# Patient Record
Sex: MALE | Race: WHITE | NOT HISPANIC OR LATINO | URBAN - METROPOLITAN AREA
[De-identification: names, ages, dates, MRNs, and addresses within clinical notes are randomized per-mention and may not be internally consistent; named-entity substitution may affect disease eponyms.]

---

## 2022-11-08 ENCOUNTER — OFFICE VISIT (OUTPATIENT)
Dept: URGENT CARE | Facility: CLINIC | Age: 72
End: 2022-11-08
Payer: MEDICARE

## 2022-11-08 VITALS
HEART RATE: 64 BPM | BODY MASS INDEX: 31.81 KG/M2 | DIASTOLIC BLOOD PRESSURE: 79 MMHG | SYSTOLIC BLOOD PRESSURE: 131 MMHG | OXYGEN SATURATION: 97 % | WEIGHT: 240 LBS | TEMPERATURE: 98 F | RESPIRATION RATE: 20 BRPM | HEIGHT: 73 IN

## 2022-11-08 DIAGNOSIS — J06.9 VIRAL URI WITH COUGH: Primary | ICD-10-CM

## 2022-11-08 PROCEDURE — 96372 PR INJECTION,THERAP/PROPH/DIAG2ST, IM OR SUBCUT: ICD-10-PCS | Mod: S$GLB,,, | Performed by: EMERGENCY MEDICINE

## 2022-11-08 PROCEDURE — 96372 THER/PROPH/DIAG INJ SC/IM: CPT | Mod: S$GLB,,, | Performed by: EMERGENCY MEDICINE

## 2022-11-08 PROCEDURE — 99214 PR OFFICE/OUTPT VISIT, EST, LEVL IV, 30-39 MIN: ICD-10-PCS | Mod: 25,S$GLB,, | Performed by: PHYSICIAN ASSISTANT

## 2022-11-08 PROCEDURE — 99214 OFFICE O/P EST MOD 30 MIN: CPT | Mod: 25,S$GLB,, | Performed by: PHYSICIAN ASSISTANT

## 2022-11-08 RX ORDER — AMLODIPINE BESYLATE 5 MG/1
1 TABLET ORAL DAILY
COMMUNITY
Start: 2022-07-21

## 2022-11-08 RX ORDER — ATORVASTATIN CALCIUM 20 MG/1
1 TABLET, FILM COATED ORAL NIGHTLY
COMMUNITY
Start: 2022-07-22 | End: 2023-07-22

## 2022-11-08 RX ORDER — DEXAMETHASONE SODIUM PHOSPHATE 100 MG/10ML
10 INJECTION INTRAMUSCULAR; INTRAVENOUS
Status: COMPLETED | OUTPATIENT
Start: 2022-11-08 | End: 2022-11-08

## 2022-11-08 RX ORDER — PROMETHAZINE HYDROCHLORIDE AND DEXTROMETHORPHAN HYDROBROMIDE 6.25; 15 MG/5ML; MG/5ML
5 SYRUP ORAL EVERY 6 HOURS PRN
Qty: 118 ML | Refills: 0 | Status: SHIPPED | OUTPATIENT
Start: 2022-11-08

## 2022-11-08 RX ORDER — ERGOCALCIFEROL 1.25 MG/1
50000 CAPSULE ORAL
COMMUNITY
Start: 2021-12-23 | End: 2022-12-23

## 2022-11-08 RX ORDER — AMOXICILLIN 500 MG
2 CAPSULE ORAL
COMMUNITY

## 2022-11-08 RX ADMIN — DEXAMETHASONE SODIUM PHOSPHATE 10 MG: 100 INJECTION INTRAMUSCULAR; INTRAVENOUS at 10:11

## 2022-11-08 NOTE — PATIENT INSTRUCTIONS
Please follow up with your Primary care provider within 2-5 days if your signs and symptoms have not resolved or worsen.     If your condition worsens or fails to improve we recommend that you receive another evaluation at the emergency room immediately or contact your primary medical clinic to discuss your concerns.   You must understand that you have received an Urgent Care treatment only and that you may be released before all of your medical problems are known or treated. You, the patient, will arrange for follow up care as instructed.     RED FLAGS/WARNING SYMPTOMS DISCUSSED WITH PATIENT THAT WOULD WARRANT EMERGENT MEDICAL ATTENTION. PATIENT VERBALIZED UNDERSTANDING.     General Instructions for Upper Respiratory Infection (URI):     Alternate Tylenol and Ibuprofen every 3 hrs for fever, pain and inflammation.   Avoid NSAIDs (Ibuprofen, Aleve, Motrin, Aspirin) if you are pregnant, or have advanced kidney disease or history of stomach ulcers/bleeding.     Sore throat/Post Nasal Drip:  Salt water gargles, chloraseptic spray, lozenges, or cough drops   Honey/lemon water or warm tea   Cepachol   Zantac will help if there is reflux from the post nasal drip and helpful to take at night     Sinus Congestion/Runny nose:  Zyrtec/Claritin/Allegra during the day and Benadryl at night as needed  Mucinex, Dayquil, or Coricidin   If you DO NOT have Hypertension (high blood pressure) or any history of palpitations, it is ok to take over the counter Sudafed or Mucinex D or Allegra-D or Claritin-D or Zyrtec-D.  If you do take one of the above, it is ok to combine that with plain over the counter Mucinex or Allegra or Claritin or Zyrtec. If, for example, you are taking Zyrtec -D, you can combine that with Mucinex, but not Mucinex-D. If you are taking Mucinex-D, you can combine that with plain Allegra or Claritin or Zyrtec.  If you DO have Hypertension or palpitations, it is safe to take Coricidin HBP for relief of sinus  symptoms.  Nasal saline spray reduces inflammation and dryness  Flonase OTC or Nasacort OTC to help decrease inflammation in nasal turbinates and allow sinuses to drain  Warm face compresses/hot showers as often as you can to open sinuses and allow to drain.   Vicks vapor rub and/or humidifier at night  Cold-eeze helps to reduce the duration of URI symptoms if taken early  Elderberry, Emergen-C, and/or Zinc to reduce duration of viral URI symptoms    Cough:  Robitussin or Delsym as needed  Cough drops  Vicks vapor rub and/or humidifier at night       Rest as much as you can     Your symptoms are likely viral and will typically last 7-10 days, maybe longer depending on how it affects your body.  You are contagious until day 5-7, so minimize contact with others to reduce the spread to others and stay home from work or school as we discussed. Dehydration is preventable but is one of the main reasons why you will feel so badly. Drink pedialyte, gatorade or propel. Stay hydrated.  Antibiotics are not needed unless a complication( such as Otitis Media, Bacterial sinus infection or pneumonia) develops. Taking antibiotics for Flu/Cold is not supported by evidence-based medicine and can expose you to unnecessary side effects of the medication, such as anaphylaxis, yeast infection and leads to antibiotic resistance.     Please follow up with your primary care provider within 5-7 days if your signs and symptoms have not resolved or worsen.  If your condition worsens or fails to improve we recommend that you receive another evaluation at the emergency  room immediately or contact your primary medical clinic to discuss your concerns.  You must understand that you have received an Urgent Care treatment only and that you may be released before all of  your medical problems are known or treated. You, the patient, will arrange for follow up care as instructed.    Go to Emergency Room immediately if you experience any:  Chest pain,  shortness of breath, wheezing or difficulty breathing,  Severe headache, face, neck or ear pain,  New rash,  Fever over 101.5º F (38.6 C) for more than three days,  Confusion, behavior change or seizure,  Severe weakness or dizziness or passing out

## 2022-11-08 NOTE — PROGRESS NOTES
"Subjective:       Patient ID: Andrew Gonzales is a 72 y.o. male.    Vitals:  height is 6' 1" (1.854 m) and weight is 108.9 kg (240 lb). His tympanic temperature is 97.7 °F (36.5 °C). His blood pressure is 131/79 and his pulse is 64. His respiration is 20 and oxygen saturation is 97%.     Chief Complaint: Sinus Problem    Patient presents to clinic with productive cough and nasal congestion that started about 3 days ago.  Denies any wheezing, shortness of breath, chest pain.  Patient states that he does feel like he has had low-grade fever but has not taken his temperature.  He has been taking Mucinex and Zicam with some relief.  He is requesting steroid injection.  Does not wish to be COVID tested.    Sinus Problem  This is a new problem. Episode onset: 11/5. The problem has been gradually worsening since onset. There has been no fever. His pain is at a severity of 0/10. He is experiencing no pain. Associated symptoms include congestion, coughing and sinus pressure. Pertinent negatives include no chills, diaphoresis, ear pain, headaches, shortness of breath or sore throat. (Drainage, mucus) Past treatments include oral decongestants. The treatment provided no relief.     Constitution: Negative for chills, sweating and fever.   HENT:  Positive for congestion and sinus pressure. Negative for ear pain and sore throat.    Cardiovascular: Negative.    Respiratory:  Positive for cough and sputum production. Negative for chest tightness, shortness of breath and wheezing.    Gastrointestinal: Negative.    Musculoskeletal: Negative.    Skin: Negative.    Neurological:  Negative for headaches.     Objective:      Physical Exam   Constitutional: He appears well-developed.  Non-toxic appearance. He does not appear ill. No distress.   HENT:   Head: Normocephalic and atraumatic.   Ears:   Right Ear: External ear normal.   Left Ear: External ear normal.   Nose: Rhinorrhea and congestion present.   Mouth/Throat: Mucous membranes " are moist. Oropharynx is clear.   Eyes: Conjunctivae and EOM are normal.   Neck: Neck supple.   Pulmonary/Chest: Effort normal and breath sounds normal. No respiratory distress. He has no wheezes.         Comments: + coughing intermittently    Abdominal: Normal appearance.   Musculoskeletal: Normal range of motion.         General: Normal range of motion.   Neurological: no focal deficit. He is alert. He displays no weakness. Gait normal.   Skin: Skin is warm, dry, not diaphoretic, not pale and no rash.   Psychiatric: His behavior is normal.       Assessment:       1. Viral URI with cough          Plan:       Patient requested steroid injection. Discussed risks & possible side effects of steroid injection. Pt verbalized understanding of risks associated with injection and wished to proceed with treatment.  Promethazine DM as needed and discussed may cause drowsiness.  Continue to monitor symptoms and follow up with PCP if symptoms worsen or fail to improve.    Viral URI with cough  -     promethazine-dextromethorphan (PROMETHAZINE-DM) 6.25-15 mg/5 mL Syrp; Take 5 mLs by mouth every 6 (six) hours as needed (cough). May cause drowsiness.  Dispense: 118 mL; Refill: 0  -     dexAMETHasone injection 10 mg

## 2022-12-26 ENCOUNTER — OFFICE VISIT (OUTPATIENT)
Dept: URGENT CARE | Facility: CLINIC | Age: 72
End: 2022-12-26
Payer: MEDICARE

## 2022-12-26 VITALS
DIASTOLIC BLOOD PRESSURE: 90 MMHG | WEIGHT: 250 LBS | HEIGHT: 73 IN | HEART RATE: 81 BPM | RESPIRATION RATE: 18 BRPM | SYSTOLIC BLOOD PRESSURE: 149 MMHG | OXYGEN SATURATION: 96 % | TEMPERATURE: 101 F | BODY MASS INDEX: 33.13 KG/M2

## 2022-12-26 DIAGNOSIS — J02.8 SORE THROAT (VIRAL): ICD-10-CM

## 2022-12-26 DIAGNOSIS — U07.1 COVID-19 VIRUS DETECTED: ICD-10-CM

## 2022-12-26 DIAGNOSIS — U07.1 COVID: Primary | ICD-10-CM

## 2022-12-26 DIAGNOSIS — J10.1 INFLUENZA B: ICD-10-CM

## 2022-12-26 DIAGNOSIS — B97.89 SORE THROAT (VIRAL): ICD-10-CM

## 2022-12-26 LAB
CTP QC/QA: YES
CTP QC/QA: YES
POC MOLECULAR INFLUENZA A AGN: NEGATIVE
POC MOLECULAR INFLUENZA B AGN: POSITIVE
SARS-COV-2 AG RESP QL IA.RAPID: POSITIVE

## 2022-12-26 PROCEDURE — 87502 POCT INFLUENZA A/B MOLECULAR: ICD-10-PCS | Mod: QW,S$GLB,,

## 2022-12-26 PROCEDURE — 87502 INFLUENZA DNA AMP PROBE: CPT | Mod: QW,S$GLB,,

## 2022-12-26 PROCEDURE — 99214 OFFICE O/P EST MOD 30 MIN: CPT | Mod: CS,S$GLB,,

## 2022-12-26 PROCEDURE — 87811 SARS CORONAVIRUS 2 ANTIGEN POCT, MANUAL READ: ICD-10-PCS | Mod: QW,CR,S$GLB,

## 2022-12-26 PROCEDURE — 87811 SARS-COV-2 COVID19 W/OPTIC: CPT | Mod: QW,CR,S$GLB,

## 2022-12-26 PROCEDURE — 99214 PR OFFICE/OUTPT VISIT, EST, LEVL IV, 30-39 MIN: ICD-10-PCS | Mod: CS,S$GLB,,

## 2022-12-26 RX ORDER — OSELTAMIVIR PHOSPHATE 75 MG/1
75 CAPSULE ORAL 2 TIMES DAILY
Qty: 10 CAPSULE | Refills: 0 | Status: SHIPPED | OUTPATIENT
Start: 2022-12-26 | End: 2022-12-31

## 2022-12-26 NOTE — PROGRESS NOTES
"Subjective:       Patient ID: Andrew Gonzales is a 72 y.o. male.    Vitals:  height is 6' 1" (1.854 m) and weight is 113.4 kg (250 lb). His tympanic temperature is 100.7 °F (38.2 °C) (abnormal). His blood pressure is 149/90 (abnormal) and his pulse is 81. His respiration is 18 and oxygen saturation is 96%.     Chief Complaint: Sore Throat (Body aches, congestion, and productive cough)    Pt presents to the clinic today with sore throat, body aches, congestion, and productive cough that all began about 2 days ago. Pt states that he saw his Dr last week, had a chest xray done and was told he needs to be seen by a pulmonologist but could not get appt due to holidays.     Sore Throat   This is a new problem. The current episode started in the past 7 days. The problem has been gradually worsening. Neither side of throat is experiencing more pain than the other. There has been no fever. The pain is at a severity of 5/10. The pain is moderate. Associated symptoms include congestion and coughing. Pertinent negatives include no abdominal pain, diarrhea, ear discharge, ear pain, headaches, neck pain, shortness of breath, trouble swallowing or vomiting. He has had no exposure to strep or mono. Treatments tried: Zycam. The treatment provided no relief.     Constitution: Negative for chills, fatigue and fever.   HENT:  Positive for congestion and sore throat. Negative for ear pain, ear discharge, postnasal drip, sinus pain and trouble swallowing.    Neck: Negative for neck pain and neck stiffness.   Cardiovascular:  Negative for chest pain.   Eyes:  Negative for eye pain.   Respiratory:  Positive for cough. Negative for sputum production, shortness of breath and wheezing.    Gastrointestinal:  Negative for abdominal pain, nausea, vomiting and diarrhea.   Neurological:  Negative for dizziness and headaches.     Objective:      Physical Exam   Constitutional: He is oriented to person, place, and time. He appears well-developed. " He is cooperative.  Non-toxic appearance. He does not appear ill. No distress.      Comments:Sitting comfortably in exam room, well appearing. NAD  Able to talk in complete sentences without difficulty or pause       HENT:   Head: Normocephalic and atraumatic.   Ears:   Right Ear: Hearing, tympanic membrane, external ear and ear canal normal.   Left Ear: Hearing, tympanic membrane, external ear and ear canal normal.   Nose: Nose normal. No mucosal edema, rhinorrhea or nasal deformity. No epistaxis. Right sinus exhibits no maxillary sinus tenderness and no frontal sinus tenderness. Left sinus exhibits no maxillary sinus tenderness and no frontal sinus tenderness.   Mouth/Throat: Uvula is midline, oropharynx is clear and moist and mucous membranes are normal. No trismus in the jaw. Normal dentition. No uvula swelling. No oropharyngeal exudate, posterior oropharyngeal edema or posterior oropharyngeal erythema.   Eyes: Conjunctivae and lids are normal. No scleral icterus.   Neck: Trachea normal and phonation normal. Neck supple. No edema present. No erythema present. No neck rigidity present.   Cardiovascular: Normal rate, regular rhythm, normal heart sounds and normal pulses.   Pulmonary/Chest: Effort normal and breath sounds normal. No respiratory distress. He has no decreased breath sounds. He has no rhonchi.         Comments: No increased WOB  O2 sat 96% RA    Abdominal: Normal appearance.   Musculoskeletal: Normal range of motion.         General: No deformity. Normal range of motion.   Neurological: He is alert and oriented to person, place, and time. He exhibits normal muscle tone. Coordination normal.   Skin: Skin is warm, dry, intact, not diaphoretic and not pale.   Psychiatric: His speech is normal and behavior is normal. Judgment and thought content normal.   Nursing note and vitals reviewed.      Assessment:       1. COVID    2. Sore throat (viral)    3. Influenza B            Plan:         Pos covid and flu  B. Discussed tx with both anti-virals as patient has risk factors for progression to worsening illness. Patient would like to do both paxlovid and tamiflu. Discussed side effects in depth regarding each medication as well as how to properly take. Discussed expected course of illness. Red flag signs/sx that warrants ED evaluation discussed with patient/parent who verbalized understanding      COVID  -     nirmatrelvir-ritonavir 300 mg (150 mg x 2)-100 mg copackaged tablets (EUA); Take 3 tablets by mouth 2 (two) times daily for 5 days. Each dose contains 2 nirmatrelvir (pink tablets) and 1 ritonavir (white tablet). Take all 3 tablets together  Dispense: 30 tablet; Refill: 0    Sore throat (viral)  -     SARS Coronavirus 2 Antigen, POCT Manual Read  -     POCT Influenza A/B MOLECULAR    Influenza B  -     oseltamivir (TAMIFLU) 75 MG capsule; Take 1 capsule (75 mg total) by mouth 2 (two) times daily. for 5 days  Dispense: 10 capsule; Refill: 0

## 2022-12-26 NOTE — PATIENT INSTRUCTIONS
Stop your cholesterol medication (Atorvastatin) for the 5 days you are taking Paxlovid. Monitor your BP as well to ensure it is not dropping to low  OTC Immitrol is available for nausea as well as pepto bismol        You have tested positive for COVID-19 today.      ISOLATION  If you tested positive and do not have symptoms, you must isolate for 5 days starting on the day of the positive test. I    If you tested positive and have symptoms, you must isolate for 5 days starting on the day of the first symptoms,  not the day of the positive test.     This is the most important part, both the CDC and the [facility] emphasize that you do not test out of isolation.     After 5 days, if your symptoms have improved and you have not had fever on day 5, you can return to the community on day 6- NO TESTING REQUIRED!      In fact, we do not retest if you were positive in the last 90 days.    After your 5 days of isolation are completed, the CDC recommends strict mask use for the first 5 days that you come out of isolation. PLEASE FOLLOW CDC GUIDELINES REGARDING TRAVEL AFTER COVID INFECTION.    Return to Urgent Care or go to ER if symptoms worsen or fail to improve.  Follow up with PCP as recommended for further management.     Your symptoms should begin to improve by Day 5 of the infection-- if symptoms worsen, you develop a new fever, shortness of breath, worsening shortness of breath with activity, chest pain, worsening cough, you must return to Urgent Care or go to the ER.        Please follow up with your primary care doctor or specialist in the next 48-72hrs as needed and if no improvement    If you  smoke, please stop smoking.      Please return or see your primary care doctor if you develop new or worsening symptoms.     Please arrange follow up with your primary medical clinic as soon as possible. You must understand that you've received an Urgent Care treatment only and that you may be released before all of your medical  problems are known or treated. You, the patient, will arrange for follow up as instructed. If your symptoms worsen or fail to improve you should go to the Emergency Room.        You have been prescribed Paxlovid, which is an antiviral medication.     Oral antiviral treatment may help your body fight COVID-19 by stopping the SARS-CoV-2 virus (the virus that causes COVID-19) from multiplying in your body, lowering the amount of the virus within your body, or helping your immune system. By getting treatment, you could have less serious symptoms and may lower the chances of your illness getting worse and needing care in the hospital.    To be eligible for Paxlovid, you must be at least 12 years of age and weigh at least 88 pounds.    What is PAXLOVID?    PAXLOVID is an investigational medicine used to treat mild-to-moderate COVID-19 in  adults and children [12 years of age and older weighing at least 88 pounds (40 kg)] with  positive results of direct SARS-CoV-2 viral testing, and who are at high risk for  progression to severe COVID-19, including hospitalization or death. PAXLOVID is  investigational because it is still being studied. There is limited information about the  safety and effectiveness of using PAXLOVID to treat people with mild-to-moderate  COVID-19.  The FDA has authorized the emergency use of PAXLOVID for the treatment of mild-tomoderate COVID-19 in adults and children [12 years of age and older weighing at least  88 pounds (40 kg)] with a positive test for the virus that causes COVID-19, and who are  at high risk for progression to severe COVID-19, including hospitalization or death,  under an EUA.    Tell your healthcare provider if you:  ? Have any allergies  ? Have liver or kidney disease  ? Are pregnant or plan to become pregnant  ? Are breastfeeding a child  ? Have any serious illnesses  Tell your healthcare provider about all the medicines you take, including  prescription and over-the-counter  medicines, vitamins, and herbal supplements.  Some medicines may interact with PAXLOVID and may cause serious side effects.  Keep a list of your medicines to show your healthcare provider and pharmacist when  you get a new medicine.  You can ask your healthcare provider or pharmacist for a list of medicines that interact  with PAXLOVID. Do not start taking a new medicine without telling your  healthcare provider. Your healthcare provider can tell you if it is safe to take  PAXLOVID with other medicines    How do I take PAXLOVID?  ? PAXLOVID consists of 2 medicines: nirmatrelvir and ritonavir.  Take 2 pink tablets of nirmatrelvir with 1 white tablet of ritonavir by mouth  2 times each day (in the morning and in the evening) for 5 days. For each  dose, take all 3 tablets at the same time.  If you have kidney disease, talk to your healthcare provider. You  may need a different dose.  Swallow the tablets whole. Do not chew, break, or crush the tablets.  Take PAXLOVID with or without food.  Do not stop taking PAXLOVID without talking to your healthcare provider, even if  you feel better.  If you miss a dose of PAXLOVID within 8 hours of the time it is usually taken,  take it as soon as you remember. If you miss a dose by more than 8 hours, skip  the missed dose and take the next dose at your regular time. Do not take  2 doses of PAXLOVID at the same time.  If you take too much PAXLOVID, call your healthcare provider or go to the  nearest hospital emergency room right away.  If you are taking a ritonavir- or cobicistat-containing medicine to treat hepatitis C  or Human Immunodeficiency Virus (HIV), you should continue to take your  medicine as prescribed by your healthcare provider    Possible side effects of PAXLOVID are:  ? Allergic Reactions. Allergic reactions can happen in people taking  PAXLOVID, even after only 1 dose. Stop taking PAXLOVID and call your  healthcare provider right away if you get any of the  following symptoms of an  allergic reaction:  hives  trouble swallowing or breathing  swelling of the mouth, lips, or face  throat tightness  hoarseness  skin rash  ? Liver Problems. Tell your healthcare provider right away if you have any of  these signs and symptoms of liver problems: loss of appetite, yellowing of your  skin and the whites of eyes (jaundice), dark-colored urine, pale colored stools  and itchy skin, stomach area (abdominal) pain.  ? Resistance to HIV Medicines: If you have untreated HIV infection, PAXLOVID  may lead to some HIV medicines not working as well in the future.  ? Other possible side effects include:  altered sense of taste  diarrhea  high blood pressure  muscle aches  These are not all the possible side effects of PAXLOVID. Not many people have taken  PAXLOVID. Serious and unexpected side effects may happen. PAXLOVID is still being  studied, so it is possible that all of the risks are not known at this time.

## 2023-01-16 ENCOUNTER — HOSPITAL ENCOUNTER (OUTPATIENT)
Dept: RADIOLOGY | Facility: CLINIC | Age: 73
Discharge: HOME | End: 2023-01-16
Attending: PHYSICIAN ASSISTANT
Payer: MEDICARE

## 2023-01-16 ENCOUNTER — OFFICE VISIT (OUTPATIENT)
Dept: URGENT CARE | Facility: CLINIC | Age: 73
End: 2023-01-16
Payer: MEDICARE

## 2023-01-16 VITALS
HEART RATE: 101 BPM | TEMPERATURE: 98 F | DIASTOLIC BLOOD PRESSURE: 91 MMHG | OXYGEN SATURATION: 93 % | RESPIRATION RATE: 20 BRPM | HEIGHT: 73 IN | SYSTOLIC BLOOD PRESSURE: 153 MMHG | BODY MASS INDEX: 33.13 KG/M2 | WEIGHT: 250 LBS

## 2023-01-16 DIAGNOSIS — R05.9 COUGH, UNSPECIFIED TYPE: ICD-10-CM

## 2023-01-16 DIAGNOSIS — J20.9 ACUTE BRONCHITIS, UNSPECIFIED ORGANISM: Primary | ICD-10-CM

## 2023-01-16 DIAGNOSIS — R06.2 BILATERAL WHEEZING: ICD-10-CM

## 2023-01-16 PROCEDURE — 71046 X-RAY EXAM CHEST 2 VIEWS: CPT | Mod: S$GLB,,, | Performed by: RADIOLOGY

## 2023-01-16 PROCEDURE — 99213 PR OFFICE/OUTPT VISIT, EST, LEVL III, 20-29 MIN: ICD-10-PCS | Mod: 25,S$GLB,, | Performed by: PHYSICIAN ASSISTANT

## 2023-01-16 PROCEDURE — 71046 XR CHEST PA AND LATERAL: ICD-10-PCS | Mod: S$GLB,,, | Performed by: RADIOLOGY

## 2023-01-16 PROCEDURE — 94640 PR INHAL RX, AIRWAY OBST/DX SPUTUM INDUCT: ICD-10-PCS | Mod: S$GLB,,, | Performed by: PHYSICIAN ASSISTANT

## 2023-01-16 PROCEDURE — 99213 OFFICE O/P EST LOW 20 MIN: CPT | Mod: 25,S$GLB,, | Performed by: PHYSICIAN ASSISTANT

## 2023-01-16 PROCEDURE — 94640 AIRWAY INHALATION TREATMENT: CPT | Mod: S$GLB,,, | Performed by: PHYSICIAN ASSISTANT

## 2023-01-16 RX ORDER — IPRATROPIUM BROMIDE 0.5 MG/2.5ML
0.5 SOLUTION RESPIRATORY (INHALATION)
Status: COMPLETED | OUTPATIENT
Start: 2023-01-16 | End: 2023-01-16

## 2023-01-16 RX ORDER — ALBUTEROL SULFATE 0.83 MG/ML
2.5 SOLUTION RESPIRATORY (INHALATION)
Status: COMPLETED | OUTPATIENT
Start: 2023-01-16 | End: 2023-01-16

## 2023-01-16 RX ORDER — NEBULIZER AND COMPRESSOR
1 EACH MISCELLANEOUS
Qty: 1 EACH | Refills: 0 | Status: SHIPPED | OUTPATIENT
Start: 2023-01-16 | End: 2023-01-16 | Stop reason: ALTCHOICE

## 2023-01-16 RX ORDER — AZITHROMYCIN 250 MG/1
TABLET, FILM COATED ORAL
Qty: 6 TABLET | Refills: 0 | Status: SHIPPED | OUTPATIENT
Start: 2023-01-16

## 2023-01-16 RX ORDER — IPRATROPIUM BROMIDE AND ALBUTEROL SULFATE 2.5; .5 MG/3ML; MG/3ML
3 SOLUTION RESPIRATORY (INHALATION) EVERY 6 HOURS PRN
Qty: 75 ML | Refills: 0 | Status: SHIPPED | OUTPATIENT
Start: 2023-01-16 | End: 2024-01-16

## 2023-01-16 RX ORDER — HYDROCHLOROTHIAZIDE 12.5 MG/1
1 CAPSULE ORAL EVERY MORNING
COMMUNITY
Start: 2022-12-01 | End: 2023-12-01

## 2023-01-16 RX ORDER — CODEINE PHOSPHATE AND GUAIFENESIN 10; 100 MG/5ML; MG/5ML
5 SOLUTION ORAL 3 TIMES DAILY PRN
Qty: 120 ML | Refills: 0 | Status: SHIPPED | OUTPATIENT
Start: 2023-01-16

## 2023-01-16 RX ORDER — PREDNISONE 20 MG/1
TABLET ORAL
Qty: 15 TABLET | Refills: 0 | Status: SHIPPED | OUTPATIENT
Start: 2023-01-16 | End: 2023-01-24

## 2023-01-16 RX ORDER — ALBUTEROL SULFATE 90 UG/1
2 AEROSOL, METERED RESPIRATORY (INHALATION) EVERY 6 HOURS PRN
COMMUNITY
Start: 2022-12-21 | End: 2023-01-16 | Stop reason: ALTCHOICE

## 2023-01-16 RX ADMIN — ALBUTEROL SULFATE 2.5 MG: 0.83 SOLUTION RESPIRATORY (INHALATION) at 11:01

## 2023-01-16 RX ADMIN — IPRATROPIUM BROMIDE 0.5 MG: 0.5 SOLUTION RESPIRATORY (INHALATION) at 11:01

## 2023-01-16 NOTE — PROGRESS NOTES
"Subjective:       Patient ID: Andrew Gonzales is a 72 y.o. male.    Vitals:  height is 6' 1" (1.854 m) and weight is 113.4 kg (250 lb). His tympanic temperature is 97.7 °F (36.5 °C). His blood pressure is 153/91 (abnormal) and his pulse is 101. His respiration is 20 and oxygen saturation is 93% (abnormal).     Chief Complaint: Cough    Pt is here today with a cough that he has had for appx 4 weeks. He was diagnosed with covid and flu on 12/26/22 and the cough has progressively gotten worse. Cough is productive. Pt also feels he has wheezing both on inhalation and exhalation which has gotten worse. States promethazine dm and albuterol almost gone and not helping as much as they used to. He says he got XR done by his PCP on 12/21/22 (images not available but report reads: "No significant detrimental change from prior study. Mild prominence of the perihilar and interstitial markings which appears similar to prior study and may reflect inflammatory type changes. No lobar pulmonary consolidation"). He has an appt with a pulmonologist tomorrow for a breathing test due to his coughing and then clinic appt next week to discuss.  Patient denies any history of asthma or COPD.  Denies any fever/chills, hemoptysis, palpitations, lower extremity swelling.  Does have some chest discomfort and headache when coughing.    Cough  This is a new problem. The current episode started 1 to 4 weeks ago. The problem has been gradually worsening. The problem occurs every few minutes. The cough is Productive of sputum. Associated symptoms include chest pain, chills, headaches, sweats and wheezing. Pertinent negatives include no ear congestion, ear pain, fever, heartburn, hemoptysis, myalgias, nasal congestion, postnasal drip, rash, rhinorrhea, sore throat, shortness of breath or weight loss. The symptoms are aggravated by lying down. He has tried steroid inhaler for the symptoms. There is no history of asthma, bronchiectasis, bronchitis, " COPD, emphysema, environmental allergies or pneumonia.     Constitution: Positive for chills. Negative for fever.   HENT:  Negative for ear pain, postnasal drip and sore throat.    Cardiovascular:  Positive for chest pain.   Respiratory:  Positive for cough and wheezing. Negative for bloody sputum and shortness of breath.    Gastrointestinal:  Negative for heartburn.   Musculoskeletal:  Negative for muscle ache.   Skin:  Negative for rash.   Allergic/Immunologic: Negative for environmental allergies.   Neurological:  Positive for headaches.     Objective:      Physical Exam   Constitutional: He appears well-developed.  Non-toxic appearance. He does not appear ill. No distress.   HENT:   Head: Normocephalic and atraumatic.   Ears:   Right Ear: External ear normal.   Left Ear: External ear normal.   Nose: Nose normal.   Eyes: Conjunctivae and EOM are normal.   Neck: Neck supple.   Cardiovascular: Normal rate, regular rhythm and normal heart sounds.   Pulmonary/Chest: Effort normal. No respiratory distress. He has wheezes (exhaltory wheezing throughout but most prominent in upper lobes). He has rhonchi. He exhibits no tenderness.   Abdominal: Normal appearance.   Musculoskeletal: Normal range of motion.         General: Normal range of motion.   Neurological: no focal deficit. He is alert. He displays no weakness. Gait normal.   Skin: Skin is warm, dry, not diaphoretic, not pale and no rash.   Psychiatric: His behavior is normal.        SpO2 sats prior to duoneb tx: 91%  SpO2 sats after duoneb tx: 93%; breath sounds improved significantly bilaterally    XR CHEST PA AND LATERAL    Result Date: 1/16/2023  EXAMINATION: XR CHEST PA AND LATERAL CLINICAL HISTORY: Cough, unspecified TECHNIQUE: PA and lateral views of the chest were performed. COMPARISON: None. FINDINGS: There is no consolidation, effusion, or pneumothorax.  Cardiomediastinal silhouette is unremarkable.  Regional osseous structures are unremarkable.     No  acute cardiopulmonary process. Electronically signed by: Hipolito Blackwood MD Date:    01/16/2023 Time:    11:16       Assessment:       1. Acute bronchitis, unspecified organism    2. Cough, unspecified type    3. Bilateral wheezing          Plan:       I have reviewed the patients previous visits, labs and images to look for any trends or previous treatments.    O2 still decreased at 93%, but breath sounds improved significantly and pt feels breathing is better. Recommend to get pulse oximeter to monitor at home. CXR with no acute findings. Pt has PFT tomorrow with pulmonologist.  Continue DuoNeb treatment at home as needed.  Patient had steroid injection in November so will start him on oral prednisone taper.  Use cough medication as prescribed.  Discussed with patient this may cause drowsiness.  Close follow-up with pulmonologist and PCP as scheduled.  ED sooner for any new or worsening symptoms in the meantime.  All questions answered prior to discharge.  Acute bronchitis, unspecified organism  -     Discontinue: nebulizer and compressor Kiara; 1 Units by Misc.(Non-Drug; Combo Route) route as needed (wheezing).  Dispense: 1 each; Refill: 0  -     albuterol-ipratropium (DUO-NEB) 2.5 mg-0.5 mg/3 mL nebulizer solution; Take 3 mLs by nebulization every 6 (six) hours as needed for Wheezing. Rescue  Dispense: 75 mL; Refill: 0  -     predniSONE (DELTASONE) 20 MG tablet; Take 3 tablets (60 mg total) by mouth once daily for 2 days, THEN 2 tablets (40 mg total) once daily for 3 days, THEN 1 tablet (20 mg total) once daily for 3 days.  Dispense: 15 tablet; Refill: 0  -     guaiFENesin-codeine 100-10 mg/5 ml (TUSSI-ORGANIDIN NR)  mg/5 mL syrup; Take 5 mLs by mouth 3 (three) times daily as needed for Cough.  Dispense: 120 mL; Refill: 0  -     azithromycin (ZITHROMAX Z-ERIK) 250 MG tablet; Take 2 tablets (500 mg) on  Day 1,  followed by 1 tablet (250 mg) once daily on Days 2 through 5.  Dispense: 6 tablet; Refill: 0  -      NEBULIZER KIT (SUPPLIES) FOR HOME USE    Cough, unspecified type  -     XR CHEST PA AND LATERAL; Future; Expected date: 01/16/2023  -     albuterol nebulizer solution 2.5 mg  -     ipratropium 0.02 % nebulizer solution 0.5 mg  -     predniSONE (DELTASONE) 20 MG tablet; Take 3 tablets (60 mg total) by mouth once daily for 2 days, THEN 2 tablets (40 mg total) once daily for 3 days, THEN 1 tablet (20 mg total) once daily for 3 days.  Dispense: 15 tablet; Refill: 0  -     guaiFENesin-codeine 100-10 mg/5 ml (TUSSI-ORGANIDIN NR)  mg/5 mL syrup; Take 5 mLs by mouth 3 (three) times daily as needed for Cough.  Dispense: 120 mL; Refill: 0    Bilateral wheezing  -     XR CHEST PA AND LATERAL; Future; Expected date: 01/16/2023  -     albuterol nebulizer solution 2.5 mg  -     ipratropium 0.02 % nebulizer solution 0.5 mg  -     Discontinue: nebulizer and compressor Kiara; 1 Units by Misc.(Non-Drug; Combo Route) route as needed (wheezing).  Dispense: 1 each; Refill: 0  -     albuterol-ipratropium (DUO-NEB) 2.5 mg-0.5 mg/3 mL nebulizer solution; Take 3 mLs by nebulization every 6 (six) hours as needed for Wheezing. Rescue  Dispense: 75 mL; Refill: 0  -     predniSONE (DELTASONE) 20 MG tablet; Take 3 tablets (60 mg total) by mouth once daily for 2 days, THEN 2 tablets (40 mg total) once daily for 3 days, THEN 1 tablet (20 mg total) once daily for 3 days.  Dispense: 15 tablet; Refill: 0

## 2024-12-27 ENCOUNTER — OFFICE VISIT (OUTPATIENT)
Dept: URGENT CARE | Facility: CLINIC | Age: 74
End: 2024-12-27
Payer: MEDICARE

## 2024-12-27 VITALS
TEMPERATURE: 102 F | DIASTOLIC BLOOD PRESSURE: 63 MMHG | BODY MASS INDEX: 30.11 KG/M2 | OXYGEN SATURATION: 96 % | HEART RATE: 85 BPM | HEIGHT: 73 IN | WEIGHT: 227.19 LBS | SYSTOLIC BLOOD PRESSURE: 122 MMHG

## 2024-12-27 DIAGNOSIS — R50.9 FEVER, UNSPECIFIED FEVER CAUSE: ICD-10-CM

## 2024-12-27 DIAGNOSIS — R05.9 COUGH, UNSPECIFIED TYPE: ICD-10-CM

## 2024-12-27 DIAGNOSIS — J10.1 INFLUENZA A: Primary | ICD-10-CM

## 2024-12-27 LAB
CTP QC/QA: YES
POC MOLECULAR INFLUENZA A AGN: POSITIVE
POC MOLECULAR INFLUENZA B AGN: NEGATIVE

## 2024-12-27 RX ORDER — ACETAMINOPHEN 500 MG
1000 TABLET ORAL
Status: COMPLETED | OUTPATIENT
Start: 2024-12-27 | End: 2024-12-27

## 2024-12-27 RX ORDER — PROMETHAZINE HYDROCHLORIDE AND DEXTROMETHORPHAN HYDROBROMIDE 6.25; 15 MG/5ML; MG/5ML
5 SYRUP ORAL NIGHTLY PRN
Qty: 120 ML | Refills: 0 | Status: SHIPPED | OUTPATIENT
Start: 2024-12-27

## 2024-12-27 RX ORDER — FLUTICASONE PROPIONATE 50 MCG
2 SPRAY, SUSPENSION (ML) NASAL DAILY
Qty: 16 G | Refills: 0 | Status: SHIPPED | OUTPATIENT
Start: 2024-12-27

## 2024-12-27 RX ORDER — OSELTAMIVIR PHOSPHATE 75 MG/1
75 CAPSULE ORAL 2 TIMES DAILY
Qty: 10 CAPSULE | Refills: 0 | Status: SHIPPED | OUTPATIENT
Start: 2024-12-27 | End: 2025-01-01

## 2024-12-27 RX ADMIN — Medication 1000 MG: at 02:12

## 2024-12-27 NOTE — PROGRESS NOTES
"Subjective:      Patient ID: Andrew Gonzales is a 74 y.o. male.    Vitals:  height is 6' 1" (1.854 m) and weight is 103 kg (227 lb 2.9 oz). His tympanic temperature is 102.3 °F (39.1 °C) (abnormal). His blood pressure is 122/63 and his pulse is 85. His oxygen saturation is 96%.     Chief Complaint: Cough    Presents with nasal Congestion, runny nose, productive cough and body aches. States he is unsure when symptoms started. Has taken mucinex and cough syrup. No relief.    Cough  This is a new problem. Episode onset: 3 days. The problem has been unchanged. The cough is Non-productive. Associated symptoms include chills, a fever, headaches, myalgias, nasal congestion and postnasal drip. Nothing aggravates the symptoms. Treatments tried: cough syrup and mucin ex. The treatment provided no relief. There is no history of asthma or COPD.       Constitution: Positive for activity change, appetite change, chills, sweating, fever and generalized weakness.   HENT:  Positive for congestion and postnasal drip.    Respiratory:  Positive for cough.    Gastrointestinal:  Positive for diarrhea. Negative for nausea, vomiting and constipation.   Musculoskeletal:  Positive for muscle cramps and muscle ache.   Neurological:  Positive for headaches.      Objective:     Physical Exam   Constitutional: He is oriented to person, place, and time. He appears well-developed. He is cooperative.  Non-toxic appearance. He does not appear ill. No distress.   HENT:   Head: Normocephalic and atraumatic.   Ears:   Right Ear: Hearing, tympanic membrane, external ear and ear canal normal.   Left Ear: Hearing, tympanic membrane, external ear and ear canal normal.   Nose: Mucosal edema present. No rhinorrhea or nasal deformity. No epistaxis. Right sinus exhibits no maxillary sinus tenderness and no frontal sinus tenderness. Left sinus exhibits no maxillary sinus tenderness and no frontal sinus tenderness.   Mouth/Throat: Uvula is midline, oropharynx " is clear and moist and mucous membranes are normal. No trismus in the jaw. Normal dentition. No uvula swelling. Cobblestoning present. No oropharyngeal exudate, posterior oropharyngeal edema or posterior oropharyngeal erythema.   Eyes: Conjunctivae and lids are normal. No scleral icterus.   Neck: Trachea normal and phonation normal. Neck supple. No edema present. No erythema present. No neck rigidity present.   Cardiovascular: Normal rate, regular rhythm, normal heart sounds and normal pulses.   Pulmonary/Chest: Effort normal and breath sounds normal. No respiratory distress. He has no decreased breath sounds. He has no rhonchi.   Abdominal: Normal appearance.   Musculoskeletal: Normal range of motion.         General: No deformity. Normal range of motion.   Neurological: He is alert and oriented to person, place, and time. He exhibits normal muscle tone. Coordination normal.   Skin: Skin is warm, dry, intact, not diaphoretic and not pale.   Psychiatric: His speech is normal and behavior is normal. Judgment and thought content normal.   Nursing note and vitals reviewed.      Assessment:     1. Influenza A    2. Cough, unspecified type    3. Fever, unspecified fever cause        Plan:       Influenza A  -     oseltamivir (TAMIFLU) 75 MG capsule; Take 1 capsule (75 mg total) by mouth 2 (two) times daily. for 5 days  Dispense: 10 capsule; Refill: 0  -     promethazine-dextromethorphan (PROMETHAZINE-DM) 6.25-15 mg/5 mL Syrp; Take 5 mLs by mouth nightly as needed (Cough).  Dispense: 120 mL; Refill: 0  -     fluticasone propionate (FLONASE) 50 mcg/actuation nasal spray; 2 sprays (100 mcg total) by Each Nostril route once daily.  Dispense: 16 g; Refill: 0    Cough, unspecified type  -     POCT Influenza A/B MOLECULAR    Fever, unspecified fever cause  -     acetaminophen tablet 1,000 mg    Flu a positive  Rest  Drink plenty clear liquids  Tylenol/Ibuprofen for fever, chills, body aches  Warm salt water gargles for throat  comfort  The flu is a virus and generally runs its course in about 1 week  If symptoms worsen or fail to improve with treatment, see your Primary Care Provider or go to the nearest Emergency Room.

## 2025-05-17 ENCOUNTER — OFFICE VISIT (OUTPATIENT)
Dept: URGENT CARE | Facility: CLINIC | Age: 75
End: 2025-05-17
Payer: MEDICARE

## 2025-05-17 VITALS
BODY MASS INDEX: 28.6 KG/M2 | WEIGHT: 215.81 LBS | HEIGHT: 73 IN | OXYGEN SATURATION: 97 % | DIASTOLIC BLOOD PRESSURE: 75 MMHG | RESPIRATION RATE: 20 BRPM | SYSTOLIC BLOOD PRESSURE: 118 MMHG | TEMPERATURE: 99 F | HEART RATE: 87 BPM

## 2025-05-17 DIAGNOSIS — L25.9 CONTACT DERMATITIS AND ECZEMA: Primary | ICD-10-CM

## 2025-05-17 PROCEDURE — 99214 OFFICE O/P EST MOD 30 MIN: CPT | Mod: S$GLB,,, | Performed by: FAMILY MEDICINE

## 2025-05-17 RX ORDER — TRIAMCINOLONE ACETONIDE 5 MG/G
OINTMENT TOPICAL 2 TIMES DAILY
Qty: 15 G | Refills: 0 | Status: SHIPPED | OUTPATIENT
Start: 2025-05-17 | End: 2025-05-24

## 2025-05-17 NOTE — PROGRESS NOTES
"Subjective:      Patient ID: Andrew Gonzales is a 74 y.o. male.    Vitals:  height is 6' 1" (1.854 m) and weight is 97.9 kg (215 lb 13.3 oz). His oral temperature is 98.7 °F (37.1 °C). His blood pressure is 118/75 and his pulse is 87. His respiration is 20 and oxygen saturation is 97%.     Chief Complaint: Rash (BACK OF NECK)    Pt presents with rash to back of neck for the past three days with sever itching and redness, after doing woodwork with fine saw dust. Used leftover hydrocortisone topical not too helpful    Rash  This is a new problem. The current episode started in the past 7 days (3 days). The problem has been gradually worsening since onset. The affected locations include the neck. The rash is characterized by redness and itchiness. Associated with: extreme dust. Treatments tried: hydricortisone. The treatment provided no relief.     Respiratory: Negative.     Gastrointestinal: Negative.    Skin:  Positive for rash.    Objective:     Physical Exam   Constitutional:  Non-toxic appearance. He does not appear ill. No distress.   HENT:   Head: Normocephalic and atraumatic.   Cardiovascular: Normal rate.   Pulmonary/Chest: Effort normal.   Abdominal: Normal appearance.   Neurological: He is alert.   Skin:         Comments: Itchy erythema back of the neck in the creases only. No induration   Nursing note and vitals reviewed.    Assessment:     No diagnosis found.    Plan:       There are no diagnoses linked to this encounter.                "

## 2025-05-17 NOTE — PATIENT INSTRUCTIONS
Rx topica ointment apply to rash 2-3 x daily  Can use over the counter oral claritin/zyrtec/allergra, or benadryl to help ease itch, one pill at night  Can also use ice to ease itch  Follow up if worsens